# Patient Record
Sex: FEMALE | Race: WHITE | NOT HISPANIC OR LATINO
[De-identification: names, ages, dates, MRNs, and addresses within clinical notes are randomized per-mention and may not be internally consistent; named-entity substitution may affect disease eponyms.]

---

## 2021-07-02 ENCOUNTER — APPOINTMENT (OUTPATIENT)
Dept: PEDIATRIC ORTHOPEDIC SURGERY | Facility: CLINIC | Age: 61
End: 2021-07-02
Payer: COMMERCIAL

## 2021-07-02 VITALS — BODY MASS INDEX: 23.3 KG/M2 | WEIGHT: 145 LBS | HEIGHT: 66 IN

## 2021-07-02 DIAGNOSIS — Z82.3 FAMILY HISTORY OF STROKE: ICD-10-CM

## 2021-07-02 DIAGNOSIS — Z87.39 PERSONAL HISTORY OF OTHER DISEASES OF THE MUSCULOSKELETAL SYSTEM AND CONNECTIVE TISSUE: ICD-10-CM

## 2021-07-02 DIAGNOSIS — Z82.62 FAMILY HISTORY OF OSTEOPOROSIS: ICD-10-CM

## 2021-07-02 DIAGNOSIS — Z83.49 FAMILY HISTORY OF OTHER ENDOCRINE, NUTRITIONAL AND METABOLIC DISEASES: ICD-10-CM

## 2021-07-02 DIAGNOSIS — Z82.49 FAMILY HISTORY OF ISCHEMIC HEART DISEASE AND OTHER DISEASES OF THE CIRCULATORY SYSTEM: ICD-10-CM

## 2021-07-02 DIAGNOSIS — S46.011A STRAIN OF MUSCLE(S) AND TENDON(S) OF THE ROTATOR CUFF OF RIGHT SHOULDER, INITIAL ENCOUNTER: ICD-10-CM

## 2021-07-02 DIAGNOSIS — Z83.3 FAMILY HISTORY OF DIABETES MELLITUS: ICD-10-CM

## 2021-07-02 PROBLEM — Z00.00 ENCOUNTER FOR PREVENTIVE HEALTH EXAMINATION: Status: ACTIVE | Noted: 2021-07-02

## 2021-07-02 PROCEDURE — 73030 X-RAY EXAM OF SHOULDER: CPT

## 2021-07-02 PROCEDURE — 99072 ADDL SUPL MATRL&STAF TM PHE: CPT

## 2021-07-02 PROCEDURE — 99212 OFFICE O/P EST SF 10 MIN: CPT

## 2021-07-02 RX ORDER — RISEDRONATE SODIUM 35 MG/1
35 TABLET, FILM COATED ORAL
Refills: 0 | Status: ACTIVE | COMMUNITY

## 2021-07-02 RX ORDER — LEVOTHYROXINE SODIUM 75 UG/1
75 CAPSULE ORAL
Refills: 0 | Status: ACTIVE | COMMUNITY

## 2021-07-02 RX ORDER — ASPIRIN 81 MG
81 TABLET, DELAYED RELEASE (ENTERIC COATED) ORAL
Refills: 0 | Status: ACTIVE | COMMUNITY

## 2021-07-02 RX ORDER — ATORVASTATIN CALCIUM 10 MG/1
10 TABLET, FILM COATED ORAL
Refills: 0 | Status: ACTIVE | COMMUNITY

## 2021-07-02 RX ORDER — MULTIVIT-MIN/IRON/FOLIC ACID/K 18-600-40
50 MCG CAPSULE ORAL
Refills: 0 | Status: ACTIVE | COMMUNITY

## 2021-07-02 NOTE — ASSESSMENT
[FreeTextEntry1] : Impression: Strain rotator cuff/muscles right shoulder.\par \par This patient will be treated with formal physical therapy with the potential for steroid injection.  She will speak with her internist/hematologist with regards to her ability to take nonsteroidals.  She will return as necessary

## 2021-07-02 NOTE — PHYSICAL EXAM
[de-identified] : On exam today she has full motion of her cervical spine no spasm or tenderness the right shoulder has no evidence of atrophy.  She does have painful motion above the horizontal plane with mild restriction more so of abduction.  Mild restriction of rotation in/out.  Her strength is acceptable she does have discomfort in the subacromial space.  No significant tenderness over the bicipital groove.  There is no instability on stress no popping clicking or crepitus.\par \par X-rays two views of the right shoulder taken today reveal mild degenerative change no lesion

## 2021-07-02 NOTE — HISTORY OF PRESENT ILLNESS
[de-identified] : This 61-year-old is seen today for evaluation of longstanding right shoulder discomfort.  Over the past month or so she has had increasing pain and stiffness.  It is to be noted since last seen she was recently diagnosed as having polycythemia vera.  She is on medication for this.  She no neck pain numbness or paresthesias or pain does not radiate.

## 2022-06-16 ENCOUNTER — APPOINTMENT (OUTPATIENT)
Dept: PEDIATRIC ORTHOPEDIC SURGERY | Facility: CLINIC | Age: 62
End: 2022-06-16
Payer: COMMERCIAL

## 2022-06-16 VITALS — BODY MASS INDEX: 23.3 KG/M2 | WEIGHT: 145 LBS | HEIGHT: 66 IN

## 2022-06-16 DIAGNOSIS — M67.371: ICD-10-CM

## 2022-06-16 PROCEDURE — 73660 X-RAY EXAM OF TOE(S): CPT

## 2022-06-16 PROCEDURE — 99212 OFFICE O/P EST SF 10 MIN: CPT

## 2022-06-16 NOTE — PHYSICAL EXAM
[de-identified] : Exam today reveals minimal limp she has good motion to the ankle subtalar joint and all her toes.  She does have swelling about the first and second MTP joints no erythema or increased warmth to suggest infection.  She does have tenderness more so about the first MTP joint though her motion is only mildly restricted.\par \par X-rays of the first and second toes taken today reveal mild degenerative changes to the first MTP joint with a dorsal small spur

## 2022-06-16 NOTE — ASSESSMENT
[FreeTextEntry1] : Impression: Synovitis right first MTP joint of the foot.\par \par She will be treated with Aleve 2 tablets twice daily PC.  Of also cautioned her with regards to appropriate shoewear.  The potential for injection has been discussed should her pain significantly increased.  She will return on a as needed basis

## 2022-06-16 NOTE — HISTORY OF PRESENT ILLNESS
[de-identified] : This 61-year-old comes in with a 1 month history of insidious onset of swelling and stiffness involving the medial aspect of the right forefoot.  No obvious history of trauma precipitating event.  She notes discomfort when she walks especially using heels.  No history of injury puncture wound or bug bite.

## 2022-08-26 ENCOUNTER — RX ONLY (RX ONLY)
Age: 62
End: 2022-08-26

## 2022-08-26 ENCOUNTER — OFFICE (OUTPATIENT)
Dept: URBAN - METROPOLITAN AREA CLINIC 86 | Facility: CLINIC | Age: 62
Setting detail: OPHTHALMOLOGY
End: 2022-08-26
Payer: COMMERCIAL

## 2022-08-26 DIAGNOSIS — H25.13: ICD-10-CM

## 2022-08-26 DIAGNOSIS — H01.001: ICD-10-CM

## 2022-08-26 DIAGNOSIS — H16.223: ICD-10-CM

## 2022-08-26 DIAGNOSIS — H01.004: ICD-10-CM

## 2022-08-26 PROBLEM — H43.811 POSTERIOR VITREOUS DETACHMENT; RIGHT EYE: Status: ACTIVE | Noted: 2022-08-26

## 2022-08-26 PROBLEM — H43.393 VITREOUS FLOATERS; BOTH EYES: Status: ACTIVE | Noted: 2022-08-26

## 2022-08-26 PROCEDURE — 92004 COMPRE OPH EXAM NEW PT 1/>: CPT | Performed by: OPHTHALMOLOGY

## 2022-08-26 ASSESSMENT — TEAR BREAK UP TIME (TBUT)
OD_TBUT: T
OS_TBUT: T

## 2022-08-26 ASSESSMENT — VISUAL ACUITY
OS_BCVA: 20/20
OD_BCVA: 20/20-

## 2022-08-26 ASSESSMENT — LID EXAM ASSESSMENTS
OD_BLEPHARITIS: RUL T
OS_BLEPHARITIS: LUL T

## 2022-08-26 ASSESSMENT — REFRACTION_CURRENTRX
OD_SPHERE: +1.75
OS_SPHERE: +1.75
OS_OVR_VA: 20/
OD_OVR_VA: 20/

## 2022-08-26 ASSESSMENT — TONOMETRY
OD_IOP_MMHG: 10
OS_IOP_MMHG: 10

## 2022-08-26 ASSESSMENT — CONFRONTATIONAL VISUAL FIELD TEST (CVF)
OS_FINDINGS: FULL
OD_FINDINGS: FULL

## 2023-04-04 ENCOUNTER — APPOINTMENT (OUTPATIENT)
Dept: PEDIATRIC ORTHOPEDIC SURGERY | Facility: CLINIC | Age: 63
End: 2023-04-04
Payer: COMMERCIAL

## 2023-04-04 VITALS — WEIGHT: 145 LBS | BODY MASS INDEX: 23.3 KG/M2 | HEIGHT: 66 IN | TEMPERATURE: 98.1 F

## 2023-04-04 DIAGNOSIS — S67.191A CRUSHING INJURY OF LEFT INDEX FINGER, INITIAL ENCOUNTER: ICD-10-CM

## 2023-04-04 PROCEDURE — 73140 X-RAY EXAM OF FINGER(S): CPT

## 2023-04-04 PROCEDURE — 99212 OFFICE O/P EST SF 10 MIN: CPT

## 2023-04-04 NOTE — HISTORY OF PRESENT ILLNESS
[de-identified] : This 62-year-old is seen for evaluation of the left index finger.  She was well until 2 days ago when she closed a refrigerator door on her finger.  This caused significant pain swelling and bruising.  Prior to this she was doing well

## 2023-04-04 NOTE — ASSESSMENT
[FreeTextEntry1] : Impression: Minor crush injury left index finger.\par \par She will be treated symptomatically with over-the-counter medications and will return on a as needed basis.

## 2023-04-04 NOTE — PHYSICAL EXAM
[de-identified] : Exam today reveals obvious swelling mainly to the distal phalanx where there is a small hematoma underneath the nailbed.  She has had will to actively flex and extend the DIP joint.  There is no obvious instability on stress.\par \par X-rays ordered and taken today of the left index finger were negative for fracture

## 2024-01-04 ENCOUNTER — OFFICE (OUTPATIENT)
Dept: URBAN - METROPOLITAN AREA CLINIC 29 | Facility: CLINIC | Age: 64
Setting detail: OPHTHALMOLOGY
End: 2024-01-04
Payer: MEDICARE

## 2024-01-04 DIAGNOSIS — H43.813: ICD-10-CM

## 2024-01-04 DIAGNOSIS — H04.203: ICD-10-CM

## 2024-01-04 DIAGNOSIS — H25.093: ICD-10-CM

## 2024-01-04 PROCEDURE — 92014 COMPRE OPH EXAM EST PT 1/>: CPT | Performed by: OPHTHALMOLOGY

## 2024-01-04 PROCEDURE — 92201 OPSCPY EXTND RTA DRAW UNI/BI: CPT | Performed by: OPHTHALMOLOGY

## 2024-01-04 ASSESSMENT — SPHEQUIV_DERIVED
OS_SPHEQUIV: 0.625
OD_SPHEQUIV: 0.625

## 2024-01-04 ASSESSMENT — REFRACTION_AUTOREFRACTION
OS_AXIS: 045
OD_CYLINDER: +0.25
OD_AXIS: 070
OD_SPHERE: +0.50
OS_CYLINDER: +0.25
OS_SPHERE: +0.50

## 2024-01-04 ASSESSMENT — CONFRONTATIONAL VISUAL FIELD TEST (CVF)
OS_FINDINGS: FULL
OD_FINDINGS: FULL

## 2024-01-04 ASSESSMENT — REFRACTION_CURRENTRX
OS_SPHERE: +2.00
OS_OVR_VA: 20/
OD_OVR_VA: 20/
OD_SPHERE: +2.00

## 2024-01-04 ASSESSMENT — LID EXAM ASSESSMENTS
OD_COMMENTS: BLEPHARITIS CHANGES OF THE EYELID MARGINS
OS_COMMENTS: BLEPHARITIS CHANGES OF THE EYELID MARGINS

## 2024-03-01 ENCOUNTER — APPOINTMENT (OUTPATIENT)
Dept: PEDIATRIC ORTHOPEDIC SURGERY | Facility: CLINIC | Age: 64
End: 2024-03-01
Payer: MEDICARE

## 2024-03-01 VITALS
SYSTOLIC BLOOD PRESSURE: 120 MMHG | BODY MASS INDEX: 16.07 KG/M2 | TEMPERATURE: 96.7 F | WEIGHT: 100 LBS | DIASTOLIC BLOOD PRESSURE: 71 MMHG | HEART RATE: 73 BPM | HEIGHT: 66 IN

## 2024-03-01 DIAGNOSIS — M25.812 OTHER SPECIFIED JOINT DISORDERS, LEFT SHOULDER: ICD-10-CM

## 2024-03-01 PROCEDURE — 99212 OFFICE O/P EST SF 10 MIN: CPT

## 2024-03-01 NOTE — PHYSICAL EXAM
[de-identified] : Exam today reveals normal motion to the cervical spine no spasm or tenderness left shoulder no evidence of atrophy she has an excellent range of motion throughout with no objective significant points of tenderness present.  Her strength is excellent.  Also to be noted she does have mild discomfort to the base of the thumb on both sides consistent with mild carpal metacarpal arthritic change

## 2024-03-01 NOTE — HISTORY OF PRESENT ILLNESS
[de-identified] : This 63-year-old is seen today for evaluation of her left shoulder she has had recent mild discomfort to the shoulder without any obvious precipitating event.  She has not had any significant difficulty with placement of the arm in space.  No clicking popping.  No neck pain numbness or paresthesias.  She does have a history of polycythemia vera

## 2024-03-01 NOTE — ASSESSMENT
[FreeTextEntry1] : Impression: Mild impingement phenomena left shoulder.  She will be treated symptomatically with over-the-counter medications on appearing basis

## 2025-02-12 ENCOUNTER — OFFICE (OUTPATIENT)
Dept: URBAN - METROPOLITAN AREA CLINIC 29 | Facility: CLINIC | Age: 65
Setting detail: OPHTHALMOLOGY
End: 2025-02-12
Payer: MEDICARE

## 2025-02-12 DIAGNOSIS — H43.813: ICD-10-CM

## 2025-02-12 DIAGNOSIS — H25.093: ICD-10-CM

## 2025-02-12 DIAGNOSIS — H04.203: ICD-10-CM

## 2025-02-12 PROCEDURE — 92014 COMPRE OPH EXAM EST PT 1/>: CPT | Performed by: OPHTHALMOLOGY

## 2025-02-12 PROCEDURE — 92201 OPSCPY EXTND RTA DRAW UNI/BI: CPT | Performed by: OPHTHALMOLOGY

## 2025-02-12 ASSESSMENT — REFRACTION_CURRENTRX
OS_OVR_VA: 20/
OD_OVR_VA: 20/
OS_SPHERE: +2.00
OD_SPHERE: +2.00

## 2025-02-12 ASSESSMENT — REFRACTION_AUTOREFRACTION
OD_CYLINDER: SPH
OS_SPHERE: +0.25
OD_SPHERE: +0.25
OS_CYLINDER: SPH

## 2025-02-12 ASSESSMENT — CONFRONTATIONAL VISUAL FIELD TEST (CVF)
OS_FINDINGS: FULL
OD_FINDINGS: FULL

## 2025-02-12 ASSESSMENT — VISUAL ACUITY
OS_BCVA: 20/25-2
OD_BCVA: 20/20-1